# Patient Record
Sex: FEMALE | Race: WHITE
[De-identification: names, ages, dates, MRNs, and addresses within clinical notes are randomized per-mention and may not be internally consistent; named-entity substitution may affect disease eponyms.]

---

## 2019-10-10 ENCOUNTER — HOSPITAL ENCOUNTER (EMERGENCY)
Dept: HOSPITAL 50 - VM.ED | Age: 69
Discharge: HOME | End: 2019-10-10
Payer: COMMERCIAL
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
## 2019-10-10 DIAGNOSIS — S01.81XA: Primary | ICD-10-CM

## 2019-10-10 DIAGNOSIS — S16.1XXA: ICD-10-CM

## 2019-10-10 NOTE — CT
______________________________________________________________________________   

  

[id number] CT/CT Cervical Spine WO IV  

EXAM: NONCONTRAST CERVICAL SPINE CT  

   

 INDICATION: FALL  

   

 COMPARISON: None.  

   

 DISCUSSION: Straightening of the cervical lordosis. 2 mm spondylolisthesis  

 C6-C7. The vertebral bodies are otherwise normal in height and alignment.   

   

 No fracture or suspicious osseous lesion is identified.  

   

 Moderate degenerative disc disease C3-C4 and C5-C6 with mild to moderate changes  

 at the remaining disc levels. Mild to moderate facet arthropathy throughout the  

 cervical spine.  

   

 7 mm noncalcified nodular opacity abutting the right upper lobe pleura (image  

 170 series 2).  

   

 IMPRESSION:  

 1.  No evidence of acute cervical spine trauma.  

   

 Electronically signed by Geoff Banda MD on 10/10/2019 9:13 AM  

   

  

Geoff Banda MD                 

 10/10/19 0916    

  

Thank you for allowing us to participate in the care of your patient.

## 2019-10-10 NOTE — EDM.PDOC
ED HPI GENERAL MEDICAL PROBLEM





- General


Chief Complaint: General


Stated Complaint: FALL


Time Seen by Provider: 10/10/19 08:47


Source of Information: Reports: Patient


History Limitations: Reports: No Limitations





- History of Present Illness


INITIAL COMMENTS - FREE TEXT/NARRATIVE: 





Pt. presents to ER with complaints of head and facial pain. Pt. states that she 

slipped on the tile coming into work today, striking the R side of her head/

face on the door jam. Denies any LOC. She is not anticoagulated. Pt. is also 

experiencing some mid posterior/R lateral neck pain as well. Denies any injury 

elsewhere.


Pt. denies any acute vision loss or change. No numbness/tingling in 

extremities. No problems with speech or ambulation. Denies any nausea or 

vomiting.


Onset: Today


Location: Reports: Head, Face


Quality: Reports: Ache, Throbbing


Associated Symptoms: Reports: Headaches.  Denies: Confusion, Chest Pain, Malaise

, Nausea/Vomiting, Rash, Shortness of Breath, Syncope, Weakness


  ** Head


Pain Score (Numeric/FACES): 4





- Related Data


 Allergies











Allergy/AdvReac Type Severity Reaction Status Date / Time


 


No Known Allergies Allergy   Verified 10/10/19 08:40











Home Meds: 


 Home Meds





Citalopram [Citalopram HBr] 40 mg PO DAILY 10/10/19 [History]


hydroCHLOROthiazide [Hydrochlorothiazide] 25 mg PO DAILY 10/10/19 [History]











Past Medical History





- Past Surgical History


GI Surgical History: Reports: Bariatric Procedure





Social & Family History





- Tobacco Use


Smoking Status *Q: Never Smoker





ED ROS GENERAL





- Review of Systems


Review Of Systems: See Below


Constitutional: Reports: No Symptoms


HEENT: Reports: Other (see above)


Respiratory: Reports: No Symptoms


Cardiovascular: Reports: No Symptoms


Endocrine: Reports: No Symptoms


GI/Abdominal: Reports: No Symptoms


: Reports: No Symptoms


Musculoskeletal: Reports: Neck Pain


Skin: Reports: No Symptoms


Neurological: Reports: Headache


Psychiatric: Reports: No Symptoms


Hematologic/Lymphatic: Reports: No Symptoms


Immunologic: Reports: No Symptoms





ED EXAM, GENERAL





- Physical Exam


Exam: See Below


Exam Limited By: No Limitations


General Appearance: Alert, WD/WN, No Apparent Distress


Eye Exam: Bilateral Eye: EOMI, Normal Fundi, Normal Inspection, PERRL


Nose: Normal Inspection, No Blood


Throat/Mouth: Normal Inspection, Normal Lips, Normal Teeth, Normal Voice, No 

Airway Compromise


Head: Other (large hematoma to R temporal area with adjacent, 1 cm superficial 

laceration above eyebrow.)


Neck: Normal Inspection, Supple, Non-Tender, Full Range of Motion


Extremities: Normal Inspection, Normal Range of Motion, Non-Tender, No Pedal 

Edema, Normal Capillary Refill


Neurological: Alert, Oriented, CN II-XII Intact, Normal Cognition, Normal Gait, 

Normal Reflexes, No Motor/Sensory Deficits


Skin Exam: Warm, Dry, Intact, Normal Color, No Rash





ED GENERAL MEDICAL PROCEDURES





- Laceration/Wound Repair


  ** Right Forehead


Lac/wound length in cm: 1


Appearance: Superficial


Distal NVT: Neuro & Vascular Intact


Skin Prep: Chlorhexidine (Hibiciens), Saline


Closed with: Dermabond





Course





- Vital Signs


Last Recorded V/S: 


 Last Vital Signs











Temp  36.6 C   10/10/19 08:41


 


Pulse  74   10/10/19 08:41


 


Resp  16   10/10/19 08:41


 


BP  141/65 H  10/10/19 08:41


 


Pulse Ox  97   10/10/19 08:41














- Orders/Labs/Meds


Meds: 


Medications














Discontinued Medications














Generic Name Dose Route Start Last Admin





  Trade Name Freq  PRN Reason Stop Dose Admin


 


Hydrocodone Bitart/Acetaminophen  1 tab  10/10/19 08:35  10/10/19 08:38





  [place] 325-5 Mg  PO  10/10/19 08:36  1 tab





  ONETIME ONE   Administration





     





     





     





     














- Radiology Interpretation


Free Text/Narrative:: 





CT brain and c-spine were both negative for acute pathology.





Departure





- Departure


Time of Disposition: 09:30


Disposition: Home, Self-Care 01


Clinical Impression: 


 Closed head injury, Cervical strain, acute








- Discharge Information


Instructions:  Head Injury, Adult, Tissue Adhesive Wound Care, Cervical Sprain, 

Easy-to-Read


Referrals: 


PCP,Not In Area [Primary Care Provider] - 


Forms:  ED Department Discharge


Additional Instructions: 


Home to rest. No TV, reading, or activities requiring a lot of "brain power". 

Sleep and rest as much as possible. 





Tylenol and ibuprofen as needed for discomfort.





Drink plenty of fluids.





Recheck in clinic in 10-14 days, sooner if not gradually improving.








- Problem List Review


Problem List Initiated/Reviewed/Updated: Yes





- Assessment/Plan


Plan: 





Home to rest. No TV, reading, or activities requiring a lot of "brain power". 

Sleep and rest as much as possible. 





Tylenol and ibuprofen as needed for discomfort.





Drink plenty of fluids.





Recheck in clinic in 10-14 days, sooner if not gradually improving.

## 2019-10-10 NOTE — CT
______________________________________________________________________________   

  

[id number] CT/CT Head WO IV  

EXAM: CT Head WO IV  

   

 CLINICAL DATA: FALL  

   

 COMPARISON STUDY: None  

   

 FINDINGS:  

   

 No intracranial hemorrhage, extra-axial fluid collection, mass, or acute  

 ischemia.   

   

 Generalized parenchymal atrophy with scattered areas of nonspecific white matter  

 disease, commonly seen as sequela of chronic microvascular ischemia.  

   

 Right frontal scalp hematoma without underlying calvarial fracture. Paranasal  

 sinuses and mastoid air cells are clear.  

    

 IMPRESSION:  

   

 No acute intracranial findings.  

   

 Electronically signed by Farooq Holguin MD on 10/10/2019 9:14 AM  

   

  

Farooq Holguin DO                 

 10/10/19 0917    

  

Thank you for allowing us to participate in the care of your patient.

## 2023-08-02 ENCOUNTER — HOSPITAL ENCOUNTER (EMERGENCY)
Dept: HOSPITAL 50 - VM.ED | Age: 73
Discharge: HOME | End: 2023-08-02
Payer: MEDICARE

## 2023-08-02 DIAGNOSIS — E66.9: ICD-10-CM

## 2023-08-02 DIAGNOSIS — Z88.5: ICD-10-CM

## 2023-08-02 DIAGNOSIS — Z90.49: ICD-10-CM

## 2023-08-02 DIAGNOSIS — M19.90: ICD-10-CM

## 2023-08-02 DIAGNOSIS — Z79.899: ICD-10-CM

## 2023-08-02 DIAGNOSIS — R10.10: Primary | ICD-10-CM

## 2023-08-02 DIAGNOSIS — Z87.891: ICD-10-CM

## 2023-08-02 LAB
ALBUMIN SERPL-MCNC: 3.8 G/DL (ref 3.4–5)
ALBUMIN/GLOB SERPL: 1.12 {RATIO}
ALP SERPL-CCNC: 128 U/L (ref 46–116)
ALT SERPL-CCNC: 15 U/L (ref 14–59)
ANION GAP SERPL CALC-SCNC: 11.9 MMOL/L (ref 5–15)
APPEARANCE UR: CLEAR
APTT PPP: 39.1 SEC (ref 23.6–33.6)
AST SERPL-CCNC: 16 U/L (ref 15–37)
BASOPHILS # BLD AUTO: 0.1 X10^3/UL (ref 0–0.2)
BASOPHILS NFR BLD AUTO: 0.8 % (ref 0.2–1.2)
BILIRUB SERPL-MCNC: 0.4 MG/DL (ref 0.2–1)
BILIRUB UR STRIP-MCNC: NEGATIVE MG/DL
BUN SERPL-MCNC: 24 MG/DL (ref 7–18)
CALCIUM SERPL-MCNC: 9.6 MG/DL (ref 8.5–10.1)
CHLORIDE SERPL-SCNC: 100 MMOL/L (ref 98–107)
CO2 SERPL-SCNC: 33 MMOL/L (ref 21–32)
COLOR UR: YELLOW
CREAT CL 24H UR+SERPL-VRATE: 40.99 ML/MIN
CREAT SERPL-MCNC: 1.1 MG/DL (ref 0.55–1.02)
CRP SERPL-MCNC: 0.64 MG/DL (ref ?–0.3)
EGFRCR SERPLBLD CKD-EPI 2021: 53 ML/MIN (ref 60–?)
EOSINOPHIL # BLD AUTO: 0.3 X10^3/UL (ref 0–0.5)
EOSINOPHIL NFR BLD AUTO: 5.3 % (ref 0–4)
GLOBULIN SER-MCNC: 3.4 G/DL
GLUCOSE SERPL-MCNC: 109 MG/DL (ref 70–99)
GLUCOSE UR STRIP-MCNC: NEGATIVE MG/DL
HCT VFR BLD AUTO: 42.2 % (ref 33–47)
HGB BLD-MCNC: 14.3 G/DL (ref 12–16)
IMM GRANULOCYTES # BLD: 0.01 X10^3/UL (ref 0–0.07)
IMM GRANULOCYTES NFR BLD: 0.2 % (ref 0–0.43)
INR PPP: 2.6 (ref 2–3.5)
KETONES UR STRIP-MCNC: NEGATIVE MG/DL
LACTATE SERPL-SCNC: 0.9 MMOL/L (ref 0.4–2)
LIPASE SERPL-CCNC: 27 U/L (ref 19–71)
LYMPHOCYTES # BLD AUTO: 1.5 X10^3/UL (ref 1–4.8)
LYMPHOCYTES NFR BLD AUTO: 25.5 % (ref 25–50)
MAGNESIUM SERPL-MCNC: 2.2 MG/DL (ref 1.8–2.4)
MCH RBC QN AUTO: 31.4 PG (ref 26–32)
MCHC RBC AUTO-ENTMCNC: 33.9 G/DL (ref 32–36)
MCHC RBC AUTO-ENTMCNC: 92.7 FL (ref 78–93)
MONOCYTES # BLD AUTO: 0.7 X10^3/UL (ref 0–0.8)
MONOCYTES NFR BLD AUTO: 11.4 % (ref 2–11)
NEUTROPHILS # BLD AUTO: 3.4 X10^3/UL (ref 1.8–7.7)
NEUTROPHILS NFR BLD AUTO: 56.8 % (ref 50–80)
NITRITE UR QL: NEGATIVE
PH UR STRIP: 7 [PH] (ref 5–8)
PHOSPHATE SERPL-MCNC: 3.6 MG/DL (ref 2.6–4.7)
PLATELET # BLD AUTO: 191 X10^3/UL (ref 130–400)
POTASSIUM SERPL-SCNC: 3.9 MMOL/L (ref 3.5–5.1)
PROT SERPL-MCNC: 7.2 G/DL (ref 6.4–8.2)
PROT UR STRIP-MCNC: NEGATIVE MG/DL
PROTHROMBIN TIME: 27 SEC (ref 9.5–12.2)
RBC # BLD AUTO: 4.55 X10^6/UL (ref 4–5.5)
RBC UR QL: NEGATIVE
SODIUM SERPL-SCNC: 141 MMOL/L (ref 136–145)
SP GR UR STRIP: 1.01 (ref 1–1.03)
UROBILINOGEN UR STRIP-ACNC: 0.2 EU/DL
WBC # BLD AUTO: 6 X10^3/UL (ref 4–10)

## 2023-08-02 RX ADMIN — SODIUM CHLORIDE ONE MG: 9 INJECTION, SOLUTION INTRAVENOUS at 18:19

## 2023-08-02 RX ADMIN — IOPAMIDOL ONE ML: 612 INJECTION, SOLUTION INTRAVENOUS at 20:18

## 2023-08-02 RX ADMIN — HYDROCODONE BITARTRATE AND ACETAMINOPHEN ONE PACKET: 5; 325 TABLET ORAL at 21:37

## 2023-08-02 RX ADMIN — Medication PRN ML: at 18:18

## 2023-08-02 RX ADMIN — METHYLPREDNISOLONE SODIUM SUCCINATE ONE MG: 125 INJECTION, POWDER, FOR SOLUTION INTRAMUSCULAR; INTRAVENOUS at 21:33

## 2023-10-19 ENCOUNTER — HOSPITAL ENCOUNTER (OUTPATIENT)
Dept: HOSPITAL 50 - VM.SDS | Age: 73
Discharge: HOME | End: 2023-10-19
Attending: FAMILY MEDICINE
Payer: MEDICARE

## 2023-10-19 DIAGNOSIS — D12.2: ICD-10-CM

## 2023-10-19 DIAGNOSIS — Z88.5: ICD-10-CM

## 2023-10-19 DIAGNOSIS — Z98.890: ICD-10-CM

## 2023-10-19 DIAGNOSIS — Z98.84: ICD-10-CM

## 2023-10-19 DIAGNOSIS — Q43.8: ICD-10-CM

## 2023-10-19 DIAGNOSIS — Z86.718: ICD-10-CM

## 2023-10-19 DIAGNOSIS — G47.00: ICD-10-CM

## 2023-10-19 DIAGNOSIS — F32.A: ICD-10-CM

## 2023-10-19 DIAGNOSIS — K64.4: ICD-10-CM

## 2023-10-19 DIAGNOSIS — E66.01: ICD-10-CM

## 2023-10-19 DIAGNOSIS — Z12.11: Primary | ICD-10-CM

## 2023-10-19 DIAGNOSIS — E78.00: ICD-10-CM

## 2023-10-19 DIAGNOSIS — Z79.899: ICD-10-CM
